# Patient Record
Sex: FEMALE | Race: WHITE | NOT HISPANIC OR LATINO | URBAN - METROPOLITAN AREA
[De-identification: names, ages, dates, MRNs, and addresses within clinical notes are randomized per-mention and may not be internally consistent; named-entity substitution may affect disease eponyms.]

---

## 2023-04-05 ENCOUNTER — TELEPHONE ENCOUNTER (OUTPATIENT)
Dept: URBAN - METROPOLITAN AREA CLINIC 7 | Facility: CLINIC | Age: 63
End: 2023-04-05

## 2023-04-05 VITALS — WEIGHT: 155 LBS | BODY MASS INDEX: 27.9 KG/M2

## 2023-05-01 ENCOUNTER — LAB OUTSIDE AN ENCOUNTER (OUTPATIENT)
Dept: URBAN - METROPOLITAN AREA CLINIC 7 | Facility: CLINIC | Age: 63
End: 2023-05-01

## 2023-05-01 ENCOUNTER — WEB ENCOUNTER (OUTPATIENT)
Dept: URBAN - METROPOLITAN AREA CLINIC 7 | Facility: CLINIC | Age: 63
End: 2023-05-01

## 2023-05-01 ENCOUNTER — OFFICE VISIT (OUTPATIENT)
Dept: URBAN - METROPOLITAN AREA CLINIC 7 | Facility: CLINIC | Age: 63
End: 2023-05-01
Payer: COMMERCIAL

## 2023-05-01 VITALS
DIASTOLIC BLOOD PRESSURE: 88 MMHG | BODY MASS INDEX: 30.3 KG/M2 | RESPIRATION RATE: 16 BRPM | WEIGHT: 171 LBS | HEIGHT: 63 IN | SYSTOLIC BLOOD PRESSURE: 122 MMHG | TEMPERATURE: 97.4 F

## 2023-05-01 DIAGNOSIS — R93.5 ABNORMAL CT OF THE ABDOMEN: ICD-10-CM

## 2023-05-01 DIAGNOSIS — K63.9 MURAL THICKENING OF COLON: ICD-10-CM

## 2023-05-01 DIAGNOSIS — K21.9 CHRONIC GERD: ICD-10-CM

## 2023-05-01 DIAGNOSIS — R19.4 CHANGE IN BOWEL HABIT: ICD-10-CM

## 2023-05-01 PROBLEM — 88111009: Status: ACTIVE | Noted: 2023-05-01

## 2023-05-01 PROBLEM — 15634181000119107: Status: ACTIVE | Noted: 2023-05-01

## 2023-05-01 PROBLEM — 128524007: Status: ACTIVE | Noted: 2023-05-01

## 2023-05-01 PROCEDURE — 99205 OFFICE O/P NEW HI 60 MIN: CPT | Performed by: INTERNAL MEDICINE

## 2023-05-01 RX ORDER — CELECOXIB 200 MG/1
1 CAPSULE WITH FOOD CAPSULE ORAL TWICE DAILY
Status: ACTIVE | COMMUNITY

## 2023-05-01 RX ORDER — DULOXETINE HYDROCHLORIDE 60 MG/1
CAPSULE, DELAYED RELEASE ORAL
Qty: 30 CAPSULE | Status: DISCONTINUED | COMMUNITY

## 2023-05-01 RX ORDER — GABAPENTIN 400 MG/1
1 CAPSULE CAPSULE ORAL ONCE A DAY
Status: ACTIVE | COMMUNITY

## 2023-05-01 RX ORDER — PANTOPRAZOLE SODIUM 40 MG/1
1 TABLET TABLET, DELAYED RELEASE ORAL ONCE A DAY
Status: ACTIVE | COMMUNITY

## 2023-05-01 NOTE — HPI-TODAY'S VISIT:
61yo F with a few months of not feeling well, states she has been getting his sharp pain around her belly button sometimes related to bowel movements but sometimes without cause.  She has always been constipated goes once per week previously took mg citrate but no longer.  now finds that surgar free lemonade works better for her.  Her pcp Dr. Holloway had ordered a CT on  4/3/23 mural thickening of ascending colon near hepatic flexure for which she was referred.  [place] not available about 6yrs ago reported to be normal fno fmh.  no blood in stool.  she also has been having abnormal night time sensation of acid sitting in her throat causing gagging and coughing has to sit up in bed severe burning type throat pain.

## 2023-05-02 ENCOUNTER — TELEPHONE ENCOUNTER (OUTPATIENT)
Dept: URBAN - METROPOLITAN AREA CLINIC 7 | Facility: CLINIC | Age: 63
End: 2023-05-02

## 2023-05-04 ENCOUNTER — OFFICE VISIT (OUTPATIENT)
Dept: URBAN - METROPOLITAN AREA SURGERY CENTER 5 | Facility: SURGERY CENTER | Age: 63
End: 2023-05-04
Payer: COMMERCIAL

## 2023-05-04 DIAGNOSIS — K63.3 APHTHOUS ULCER OF COLON: ICD-10-CM

## 2023-05-04 DIAGNOSIS — K57.30 ACQUIRED DIVERTICULOSIS OF COLON: ICD-10-CM

## 2023-05-04 DIAGNOSIS — D49.0 ANAL NEOPLASM: ICD-10-CM

## 2023-05-04 DIAGNOSIS — R93.3 ABN FINDINGS-GI TRACT: ICD-10-CM

## 2023-05-04 PROCEDURE — 45381 COLONOSCOPY SUBMUCOUS NJX: CPT | Performed by: INTERNAL MEDICINE

## 2023-05-04 PROCEDURE — 45380 COLONOSCOPY AND BIOPSY: CPT | Performed by: INTERNAL MEDICINE

## 2023-05-04 RX ORDER — GABAPENTIN 400 MG/1
1 CAPSULE CAPSULE ORAL ONCE A DAY
Status: ACTIVE | COMMUNITY

## 2023-05-04 RX ORDER — PANTOPRAZOLE SODIUM 40 MG/1
1 TABLET TABLET, DELAYED RELEASE ORAL ONCE A DAY
Status: ACTIVE | COMMUNITY

## 2023-05-04 RX ORDER — CELECOXIB 200 MG/1
1 CAPSULE WITH FOOD CAPSULE ORAL TWICE DAILY
Status: ACTIVE | COMMUNITY

## 2023-05-11 ENCOUNTER — TELEPHONE ENCOUNTER (OUTPATIENT)
Dept: URBAN - METROPOLITAN AREA CLINIC 7 | Facility: CLINIC | Age: 63
End: 2023-05-11

## 2023-05-11 RX ORDER — GABAPENTIN 400 MG/1
1 CAPSULE CAPSULE ORAL ONCE A DAY
Status: ACTIVE | COMMUNITY

## 2023-05-11 RX ORDER — CELECOXIB 200 MG/1
1 CAPSULE WITH FOOD CAPSULE ORAL TWICE DAILY
Status: ACTIVE | COMMUNITY

## 2023-05-11 RX ORDER — PREDNISONE 10 MG/1
4 TABLETS ONCE A DAY FOR 7 DAYS, 3.5 TABLETS ONCE A DAY FOR 7 DAYS, 3 TABLETS ONCE A DAY FOR 7 DAYS, 2.5 TABLETS ONCE A DAY FOR 7 DAYS, 2 TABLETS ONCE A DAY FOR 7 DAYS, 1.5 TABLETS ONCE A DAY FOR 7 DAYS, 1 TABLET ONCE A DAY FOR 7 DAYS, 0.5 TABLET ONCE A DAY FOR 3 DAYS TABLET ORAL
Qty: 124 TABLET | Refills: 0 | OUTPATIENT
Start: 2023-05-11 | End: 2023-07-02

## 2023-05-11 RX ORDER — PANTOPRAZOLE SODIUM 40 MG/1
1 TABLET TABLET, DELAYED RELEASE ORAL ONCE A DAY
Status: ACTIVE | COMMUNITY

## 2023-05-15 ENCOUNTER — WEB ENCOUNTER (OUTPATIENT)
Dept: URBAN - METROPOLITAN AREA SURGERY CENTER 5 | Facility: SURGERY CENTER | Age: 63
End: 2023-05-15

## 2023-05-18 ENCOUNTER — OFFICE VISIT (OUTPATIENT)
Dept: URBAN - METROPOLITAN AREA SURGERY CENTER 5 | Facility: SURGERY CENTER | Age: 63
End: 2023-05-18
Payer: COMMERCIAL

## 2023-05-18 DIAGNOSIS — K44.9 DIAPHRAGMATIC HERNIA WITHOUT OBSTRUCTION OR GANGRENE: ICD-10-CM

## 2023-05-18 DIAGNOSIS — R12 HEARTBURN: ICD-10-CM

## 2023-05-18 DIAGNOSIS — K29.70 GASTRITIS WITHOUT BLEEDING, UNSPECIFIED CHRONICITY, UNSPECIFIED GASTRITIS TYPE: ICD-10-CM

## 2023-05-18 DIAGNOSIS — K31.89 OTHER DISEASES OF STOMACH AND DUODENUM: ICD-10-CM

## 2023-05-18 DIAGNOSIS — K20.90 ESOPHAGITIS, UNSPECIFIED WITHOUT BLEEDING: ICD-10-CM

## 2023-05-18 PROBLEM — 4556007: Status: ACTIVE | Noted: 2023-05-18

## 2023-05-18 PROCEDURE — 43239 EGD BIOPSY SINGLE/MULTIPLE: CPT | Performed by: INTERNAL MEDICINE

## 2023-05-18 RX ORDER — PREDNISONE 10 MG/1
4 TABLETS ONCE A DAY FOR 7 DAYS, 3.5 TABLETS ONCE A DAY FOR 7 DAYS, 3 TABLETS ONCE A DAY FOR 7 DAYS, 2.5 TABLETS ONCE A DAY FOR 7 DAYS, 2 TABLETS ONCE A DAY FOR 7 DAYS, 1.5 TABLETS ONCE A DAY FOR 7 DAYS, 1 TABLET ONCE A DAY FOR 7 DAYS, 0.5 TABLET ONCE A DAY FOR 3 DAYS TABLET ORAL
Qty: 124 TABLET | Refills: 0 | Status: ACTIVE | COMMUNITY
Start: 2023-05-11 | End: 2023-07-02

## 2023-05-18 RX ORDER — PANTOPRAZOLE SODIUM 40 MG/1
1 TABLET TABLET, DELAYED RELEASE ORAL ONCE A DAY
Status: ACTIVE | COMMUNITY

## 2023-05-18 RX ORDER — GABAPENTIN 400 MG/1
1 CAPSULE CAPSULE ORAL ONCE A DAY
Status: ACTIVE | COMMUNITY

## 2023-05-18 RX ORDER — CELECOXIB 200 MG/1
1 CAPSULE WITH FOOD CAPSULE ORAL TWICE DAILY
Status: ACTIVE | COMMUNITY

## 2023-05-31 ENCOUNTER — OFFICE VISIT (OUTPATIENT)
Dept: URBAN - METROPOLITAN AREA CLINIC 7 | Facility: CLINIC | Age: 63
End: 2023-05-31
Payer: COMMERCIAL

## 2023-05-31 ENCOUNTER — LAB OUTSIDE AN ENCOUNTER (OUTPATIENT)
Dept: URBAN - METROPOLITAN AREA CLINIC 7 | Facility: CLINIC | Age: 63
End: 2023-05-31

## 2023-05-31 ENCOUNTER — WEB ENCOUNTER (OUTPATIENT)
Dept: URBAN - METROPOLITAN AREA CLINIC 7 | Facility: CLINIC | Age: 63
End: 2023-05-31

## 2023-05-31 ENCOUNTER — DASHBOARD ENCOUNTERS (OUTPATIENT)
Age: 63
End: 2023-05-31

## 2023-05-31 VITALS
SYSTOLIC BLOOD PRESSURE: 144 MMHG | HEART RATE: 78 BPM | WEIGHT: 168 LBS | TEMPERATURE: 97.8 F | HEIGHT: 63 IN | DIASTOLIC BLOOD PRESSURE: 90 MMHG | BODY MASS INDEX: 29.77 KG/M2

## 2023-05-31 DIAGNOSIS — K63.9 MURAL THICKENING OF COLON: ICD-10-CM

## 2023-05-31 DIAGNOSIS — R93.5 ABNORMAL CT OF THE ABDOMEN: ICD-10-CM

## 2023-05-31 DIAGNOSIS — K29.70 GASTRITIS WITHOUT BLEEDING, UNSPECIFIED CHRONICITY, UNSPECIFIED GASTRITIS TYPE: ICD-10-CM

## 2023-05-31 DIAGNOSIS — R19.4 CHANGE IN BOWEL HABIT: ICD-10-CM

## 2023-05-31 PROCEDURE — 99214 OFFICE O/P EST MOD 30 MIN: CPT | Performed by: INTERNAL MEDICINE

## 2023-05-31 RX ORDER — DULOXETINE HYDROCHLORIDE 60 MG/1
CAPSULE, DELAYED RELEASE ORAL
Qty: 30 CAPSULE | Status: ACTIVE | COMMUNITY

## 2023-05-31 RX ORDER — HYDROXYCHLOROQUINE SULFATE 400 MG/1
1 TABLET ORAL TWICE A DAY
Status: ACTIVE | COMMUNITY

## 2023-05-31 RX ORDER — PREDNISONE 10 MG/1
4 TABLETS ONCE A DAY FOR 7 DAYS, 3.5 TABLETS ONCE A DAY FOR 7 DAYS, 3 TABLETS ONCE A DAY FOR 7 DAYS, 2.5 TABLETS ONCE A DAY FOR 7 DAYS, 2 TABLETS ONCE A DAY FOR 7 DAYS, 1.5 TABLETS ONCE A DAY FOR 7 DAYS, 1 TABLET ONCE A DAY FOR 7 DAYS, 0.5 TABLET ONCE A DAY FOR 3 DAYS TABLET ORAL
Qty: 124 TABLET | Refills: 0 | Status: ACTIVE | COMMUNITY
Start: 2023-05-11 | End: 2023-07-02

## 2023-05-31 RX ORDER — CELECOXIB 200 MG/1
1 CAPSULE WITH FOOD CAPSULE ORAL TWICE DAILY
Status: ACTIVE | COMMUNITY

## 2023-05-31 RX ORDER — PREDNISONE 10 MG/1
4 TABLETS ONCE A DAY FOR 7 DAYS, 3.5 TABLETS ONCE A DAY FOR 7 DAYS, 3 TABLETS ONCE A DAY FOR 7 DAYS, 2.5 TABLETS ONCE A DAY FOR 7 DAYS, 2 TABLETS ONCE A DAY FOR 7 DAYS, 1.5 TABLETS ONCE A DAY FOR 7 DAYS, 1 TABLET ONCE A DAY FOR 7 DAYS, 0.5 TABLET ONCE A DAY FOR 3 DAYS TABLET ORAL
OUTPATIENT
Start: 2023-05-11

## 2023-05-31 RX ORDER — ETODOLAC 400 MG/1
1 TABLET WITH FOOD TABLET, COATED ORAL TWICE A DAY
Status: ACTIVE | COMMUNITY

## 2023-05-31 RX ORDER — PANTOPRAZOLE SODIUM 40 MG/1
1 TABLET TABLET, DELAYED RELEASE ORAL ONCE A DAY
Status: ACTIVE | COMMUNITY

## 2023-05-31 RX ORDER — GABAPENTIN 400 MG/1
1 CAPSULE CAPSULE ORAL ONCE A DAY
Status: DISCONTINUED | COMMUNITY

## 2023-07-05 ENCOUNTER — TELEPHONE ENCOUNTER (OUTPATIENT)
Dept: URBAN - METROPOLITAN AREA SURGERY CENTER 9 | Facility: SURGERY CENTER | Age: 63
End: 2023-07-05

## 2023-07-05 PROBLEM — 46626002: Status: ACTIVE | Noted: 2023-07-05

## 2023-07-06 ENCOUNTER — OFFICE VISIT (OUTPATIENT)
Dept: URBAN - METROPOLITAN AREA SURGERY CENTER 9 | Facility: SURGERY CENTER | Age: 63
End: 2023-07-06
Payer: COMMERCIAL

## 2023-07-06 ENCOUNTER — TELEPHONE ENCOUNTER (OUTPATIENT)
Dept: URBAN - METROPOLITAN AREA CLINIC 9 | Facility: CLINIC | Age: 63
End: 2023-07-06

## 2023-07-06 DIAGNOSIS — K29.60 OTHER GASTRITIS WITHOUT BLEEDING: ICD-10-CM

## 2023-07-06 DIAGNOSIS — K29.70 GASTRITIS WITHOUT BLEEDING, UNSPECIFIED CHRONICITY, UNSPECIFIED GASTRITIS TYPE: ICD-10-CM

## 2023-07-06 DIAGNOSIS — R93.3 ABNORMAL FINDINGS ON DIAGNOSTIC IMAGING OF OTHER PARTS OF DIGESTIVE TRACT: ICD-10-CM

## 2023-07-06 DIAGNOSIS — K86.89 OTHER SPECIFIED DISEASES OF PANCREAS: ICD-10-CM

## 2023-07-06 PROBLEM — 425810000: Status: ACTIVE | Noted: 2023-07-06

## 2023-07-06 PROCEDURE — 43239 EGD BIOPSY SINGLE/MULTIPLE: CPT | Performed by: INTERNAL MEDICINE

## 2023-07-06 PROCEDURE — 43238 EGD US FINE NEEDLE BX/ASPIR: CPT | Performed by: INTERNAL MEDICINE

## 2023-07-06 PROCEDURE — 00731 ANES UPR GI NDSC PX NOS: CPT | Performed by: NURSE ANESTHETIST, CERTIFIED REGISTERED

## 2023-07-06 RX ORDER — PANTOPRAZOLE SODIUM 40 MG/1
1 TABLET TABLET, DELAYED RELEASE ORAL ONCE A DAY
Status: ACTIVE | COMMUNITY

## 2023-07-06 RX ORDER — DULOXETINE HYDROCHLORIDE 60 MG/1
CAPSULE, DELAYED RELEASE ORAL
Qty: 30 CAPSULE | Status: ACTIVE | COMMUNITY

## 2023-07-06 RX ORDER — CELECOXIB 200 MG/1
1 CAPSULE WITH FOOD CAPSULE ORAL TWICE DAILY
Status: ACTIVE | COMMUNITY

## 2023-07-06 RX ORDER — PREDNISONE 10 MG/1
4 TABLETS ONCE A DAY FOR 7 DAYS, 3.5 TABLETS ONCE A DAY FOR 7 DAYS, 3 TABLETS ONCE A DAY FOR 7 DAYS, 2.5 TABLETS ONCE A DAY FOR 7 DAYS, 2 TABLETS ONCE A DAY FOR 7 DAYS, 1.5 TABLETS ONCE A DAY FOR 7 DAYS, 1 TABLET ONCE A DAY FOR 7 DAYS, 0.5 TABLET ONCE A DAY FOR 3 DAYS TABLET ORAL
Status: ACTIVE | COMMUNITY
Start: 2023-05-11

## 2023-07-06 RX ORDER — HYDROXYCHLOROQUINE SULFATE 400 MG/1
1 TABLET ORAL TWICE A DAY
Status: ACTIVE | COMMUNITY

## 2023-07-06 RX ORDER — ETODOLAC 400 MG/1
1 TABLET WITH FOOD TABLET, COATED ORAL TWICE A DAY
Status: ACTIVE | COMMUNITY

## 2023-07-18 ENCOUNTER — CLAIMS CREATED FROM THE CLAIM WINDOW (OUTPATIENT)
Dept: URBAN - METROPOLITAN AREA SURGERY CENTER 5 | Facility: SURGERY CENTER | Age: 63
End: 2023-07-18
Payer: COMMERCIAL

## 2023-07-18 DIAGNOSIS — R93.3 ABNORMAL FINDINGS ON DIAGNOSTIC IMAGING OF OTHER PARTS OF DIGESTIVE TRACT: ICD-10-CM

## 2023-07-18 DIAGNOSIS — K63.89 OTHER SPECIFIED DISEASES OF INTESTINE: ICD-10-CM

## 2023-07-18 DIAGNOSIS — K57.30 DIVERTICULOSIS OF LARGE INTESTINE WITHOUT PERFORATION OR ABSCESS WITHOUT BLEEDING: ICD-10-CM

## 2023-07-18 DIAGNOSIS — K63.3 ULCERATION OF COLON: ICD-10-CM

## 2023-07-18 DIAGNOSIS — K63.3 ULCER OF INTESTINE: ICD-10-CM

## 2023-07-18 DIAGNOSIS — R93.3 ABNORMAL FINDING ON DIAGNOSTIC IMAGING OF GI TRACT: ICD-10-CM

## 2023-07-18 PROBLEM — 724538004: Status: ACTIVE | Noted: 2023-07-18

## 2023-07-18 PROCEDURE — 00811 ANES LWR INTST NDSC NOS: CPT | Performed by: NURSE ANESTHETIST, CERTIFIED REGISTERED

## 2023-07-18 PROCEDURE — 45380 COLONOSCOPY AND BIOPSY: CPT | Performed by: INTERNAL MEDICINE

## 2023-07-18 RX ORDER — PREDNISONE 10 MG/1
4 TABLETS ONCE A DAY FOR 7 DAYS, 3.5 TABLETS ONCE A DAY FOR 7 DAYS, 3 TABLETS ONCE A DAY FOR 7 DAYS, 2.5 TABLETS ONCE A DAY FOR 7 DAYS, 2 TABLETS ONCE A DAY FOR 7 DAYS, 1.5 TABLETS ONCE A DAY FOR 7 DAYS, 1 TABLET ONCE A DAY FOR 7 DAYS, 0.5 TABLET ONCE A DAY FOR 3 DAYS TABLET ORAL
Status: ACTIVE | COMMUNITY
Start: 2023-05-11

## 2023-07-18 RX ORDER — CELECOXIB 200 MG/1
1 CAPSULE WITH FOOD CAPSULE ORAL TWICE DAILY
Status: ACTIVE | COMMUNITY

## 2023-07-18 RX ORDER — HYDROXYCHLOROQUINE SULFATE 400 MG/1
1 TABLET ORAL TWICE A DAY
Status: ACTIVE | COMMUNITY

## 2023-07-18 RX ORDER — DULOXETINE HYDROCHLORIDE 60 MG/1
CAPSULE, DELAYED RELEASE ORAL
Qty: 30 CAPSULE | Status: ACTIVE | COMMUNITY

## 2023-07-18 RX ORDER — PANTOPRAZOLE SODIUM 40 MG/1
1 TABLET TABLET, DELAYED RELEASE ORAL ONCE A DAY
Status: ACTIVE | COMMUNITY

## 2023-07-18 RX ORDER — ETODOLAC 400 MG/1
1 TABLET WITH FOOD TABLET, COATED ORAL TWICE A DAY
Status: ACTIVE | COMMUNITY

## 2023-07-24 ENCOUNTER — TELEPHONE ENCOUNTER (OUTPATIENT)
Dept: URBAN - METROPOLITAN AREA CLINIC 23 | Facility: CLINIC | Age: 63
End: 2023-07-24

## 2023-07-31 ENCOUNTER — TELEPHONE ENCOUNTER (OUTPATIENT)
Dept: URBAN - METROPOLITAN AREA CLINIC 7 | Facility: CLINIC | Age: 63
End: 2023-07-31

## 2023-08-01 PROBLEM — 4556007: Status: ACTIVE | Noted: 2023-08-01

## 2023-08-04 ENCOUNTER — TELEPHONE ENCOUNTER (OUTPATIENT)
Dept: URBAN - METROPOLITAN AREA CLINIC 7 | Facility: CLINIC | Age: 63
End: 2023-08-04

## 2023-12-12 ENCOUNTER — P2P PATIENT RECORD (OUTPATIENT)
Age: 63
End: 2023-12-12

## 2024-01-18 ENCOUNTER — OFFICE VISIT (OUTPATIENT)
Dept: URBAN - METROPOLITAN AREA CLINIC 7 | Facility: CLINIC | Age: 64
End: 2024-01-18

## 2024-01-18 ENCOUNTER — TELEPHONE ENCOUNTER (OUTPATIENT)
Dept: URBAN - METROPOLITAN AREA CLINIC 7 | Facility: CLINIC | Age: 64
End: 2024-01-18

## 2024-01-18 RX ORDER — ETODOLAC 400 MG/1
1 TABLET WITH FOOD TABLET, COATED ORAL TWICE A DAY
Status: ACTIVE | COMMUNITY

## 2024-01-18 RX ORDER — DULOXETINE HYDROCHLORIDE 60 MG/1
CAPSULE, DELAYED RELEASE ORAL
Qty: 30 CAPSULE | Status: ACTIVE | COMMUNITY

## 2024-01-18 RX ORDER — HYDROXYCHLOROQUINE SULFATE 400 MG/1
1 TABLET ORAL TWICE A DAY
Status: ACTIVE | COMMUNITY

## 2024-01-18 RX ORDER — PANTOPRAZOLE SODIUM 40 MG/1
1 TABLET TABLET, DELAYED RELEASE ORAL ONCE A DAY
Status: ACTIVE | COMMUNITY

## 2024-01-18 RX ORDER — CELECOXIB 200 MG/1
1 CAPSULE WITH FOOD CAPSULE ORAL TWICE DAILY
Status: ACTIVE | COMMUNITY

## 2024-01-18 RX ORDER — PREDNISONE 10 MG/1
4 TABLETS ONCE A DAY FOR 7 DAYS, 3.5 TABLETS ONCE A DAY FOR 7 DAYS, 3 TABLETS ONCE A DAY FOR 7 DAYS, 2.5 TABLETS ONCE A DAY FOR 7 DAYS, 2 TABLETS ONCE A DAY FOR 7 DAYS, 1.5 TABLETS ONCE A DAY FOR 7 DAYS, 1 TABLET ONCE A DAY FOR 7 DAYS, 0.5 TABLET ONCE A DAY FOR 3 DAYS TABLET ORAL
Status: ACTIVE | COMMUNITY
Start: 2023-05-11

## 2024-02-29 ENCOUNTER — OV EP (OUTPATIENT)
Dept: URBAN - METROPOLITAN AREA CLINIC 7 | Facility: CLINIC | Age: 64
End: 2024-02-29

## 2024-03-21 ENCOUNTER — OV EP (OUTPATIENT)
Dept: URBAN - METROPOLITAN AREA CLINIC 7 | Facility: CLINIC | Age: 64
End: 2024-03-21